# Patient Record
Sex: FEMALE | Race: WHITE | NOT HISPANIC OR LATINO | URBAN - METROPOLITAN AREA
[De-identification: names, ages, dates, MRNs, and addresses within clinical notes are randomized per-mention and may not be internally consistent; named-entity substitution may affect disease eponyms.]

---

## 2017-04-28 ENCOUNTER — ON CAMPUS - OUTPATIENT (AMBULATORY)
Dept: URBAN - METROPOLITAN AREA HOSPITAL 2 | Facility: HOSPITAL | Age: 37
End: 2017-04-28
Payer: COMMERCIAL

## 2017-04-28 ENCOUNTER — OFFICE (AMBULATORY)
Dept: URBAN - METROPOLITAN AREA CLINIC 64 | Facility: CLINIC | Age: 37
End: 2017-04-28
Payer: COMMERCIAL

## 2017-04-28 VITALS
DIASTOLIC BLOOD PRESSURE: 91 MMHG | SYSTOLIC BLOOD PRESSURE: 122 MMHG | WEIGHT: 190 LBS | HEART RATE: 79 BPM | SYSTOLIC BLOOD PRESSURE: 127 MMHG | OXYGEN SATURATION: 100 % | DIASTOLIC BLOOD PRESSURE: 87 MMHG | RESPIRATION RATE: 18 BRPM | DIASTOLIC BLOOD PRESSURE: 101 MMHG | SYSTOLIC BLOOD PRESSURE: 164 MMHG | OXYGEN SATURATION: 99 % | DIASTOLIC BLOOD PRESSURE: 104 MMHG | TEMPERATURE: 97.2 F | HEART RATE: 64 BPM | DIASTOLIC BLOOD PRESSURE: 76 MMHG | HEART RATE: 71 BPM | OXYGEN SATURATION: 98 % | HEART RATE: 66 BPM | RESPIRATION RATE: 16 BRPM | HEART RATE: 74 BPM | HEIGHT: 62 IN | SYSTOLIC BLOOD PRESSURE: 139 MMHG | SYSTOLIC BLOOD PRESSURE: 147 MMHG

## 2017-04-28 DIAGNOSIS — K22.2 ESOPHAGEAL OBSTRUCTION: ICD-10-CM

## 2017-04-28 DIAGNOSIS — R13.10 DYSPHAGIA, UNSPECIFIED: ICD-10-CM

## 2017-04-28 LAB
GI HISTOLOGY: A. UNSPECIFIED: (no result)
GI HISTOLOGY: PDF REPORT: (no result)

## 2017-04-28 PROCEDURE — 88305 TISSUE EXAM BY PATHOLOGIST: CPT | Performed by: INTERNAL MEDICINE

## 2017-04-28 PROCEDURE — 43450 DILATE ESOPHAGUS 1/MULT PASS: CPT | Performed by: INTERNAL MEDICINE

## 2017-04-28 PROCEDURE — 43235 EGD DIAGNOSTIC BRUSH WASH: CPT | Performed by: INTERNAL MEDICINE

## 2017-04-28 RX ADMIN — PROPOFOL: 10 INJECTION, EMULSION INTRAVENOUS at 11:18

## 2023-08-30 ENCOUNTER — OFFICE (AMBULATORY)
Dept: URBAN - METROPOLITAN AREA CLINIC 64 | Facility: CLINIC | Age: 43
End: 2023-08-30

## 2023-08-30 VITALS
HEIGHT: 62 IN | SYSTOLIC BLOOD PRESSURE: 106 MMHG | WEIGHT: 173 LBS | HEART RATE: 69 BPM | DIASTOLIC BLOOD PRESSURE: 71 MMHG

## 2023-08-30 DIAGNOSIS — R19.7 DIARRHEA, UNSPECIFIED: ICD-10-CM

## 2023-08-30 DIAGNOSIS — R10.30 LOWER ABDOMINAL PAIN, UNSPECIFIED: ICD-10-CM

## 2023-08-30 DIAGNOSIS — R63.4 ABNORMAL WEIGHT LOSS: ICD-10-CM

## 2023-08-30 DIAGNOSIS — K62.5 HEMORRHAGE OF ANUS AND RECTUM: ICD-10-CM

## 2023-08-30 PROCEDURE — 99204 OFFICE O/P NEW MOD 45 MIN: CPT

## 2023-09-06 ENCOUNTER — ON CAMPUS - OUTPATIENT (AMBULATORY)
Dept: URBAN - METROPOLITAN AREA HOSPITAL 2 | Facility: HOSPITAL | Age: 43
End: 2023-09-06

## 2023-09-06 ENCOUNTER — OFFICE (AMBULATORY)
Dept: URBAN - METROPOLITAN AREA PATHOLOGY 4 | Facility: PATHOLOGY | Age: 43
End: 2023-09-06

## 2023-09-06 VITALS
SYSTOLIC BLOOD PRESSURE: 121 MMHG | HEART RATE: 88 BPM | HEART RATE: 89 BPM | DIASTOLIC BLOOD PRESSURE: 46 MMHG | SYSTOLIC BLOOD PRESSURE: 114 MMHG | OXYGEN SATURATION: 100 % | HEART RATE: 73 BPM | RESPIRATION RATE: 17 BRPM | HEART RATE: 94 BPM | HEART RATE: 87 BPM | DIASTOLIC BLOOD PRESSURE: 62 MMHG | WEIGHT: 169 LBS | TEMPERATURE: 98 F | DIASTOLIC BLOOD PRESSURE: 78 MMHG | OXYGEN SATURATION: 99 % | DIASTOLIC BLOOD PRESSURE: 79 MMHG | SYSTOLIC BLOOD PRESSURE: 95 MMHG | SYSTOLIC BLOOD PRESSURE: 118 MMHG | DIASTOLIC BLOOD PRESSURE: 60 MMHG | HEIGHT: 62 IN | SYSTOLIC BLOOD PRESSURE: 116 MMHG | RESPIRATION RATE: 16 BRPM | DIASTOLIC BLOOD PRESSURE: 70 MMHG | SYSTOLIC BLOOD PRESSURE: 115 MMHG | SYSTOLIC BLOOD PRESSURE: 89 MMHG | HEART RATE: 71 BPM | SYSTOLIC BLOOD PRESSURE: 96 MMHG | HEART RATE: 74 BPM | DIASTOLIC BLOOD PRESSURE: 83 MMHG | RESPIRATION RATE: 14 BRPM | DIASTOLIC BLOOD PRESSURE: 71 MMHG

## 2023-09-06 DIAGNOSIS — K63.5 POLYP OF COLON: ICD-10-CM

## 2023-09-06 DIAGNOSIS — K62.5 HEMORRHAGE OF ANUS AND RECTUM: ICD-10-CM

## 2023-09-06 DIAGNOSIS — K62.1 RECTAL POLYP: ICD-10-CM

## 2023-09-06 DIAGNOSIS — K64.1 SECOND DEGREE HEMORRHOIDS: ICD-10-CM

## 2023-09-06 LAB
GI HISTOLOGY: A. UNSPECIFIED: (no result)
GI HISTOLOGY: PDF REPORT: (no result)

## 2023-09-06 PROCEDURE — 88305 TISSUE EXAM BY PATHOLOGIST: CPT | Performed by: INTERNAL MEDICINE

## 2023-09-06 PROCEDURE — 45385 COLONOSCOPY W/LESION REMOVAL: CPT | Performed by: INTERNAL MEDICINE

## 2025-06-05 ENCOUNTER — PATIENT ROUNDING (BHMG ONLY) (OUTPATIENT)
Dept: FAMILY MEDICINE CLINIC | Facility: CLINIC | Age: 45
End: 2025-06-05
Payer: COMMERCIAL

## 2025-06-05 ENCOUNTER — OFFICE VISIT (OUTPATIENT)
Dept: FAMILY MEDICINE CLINIC | Facility: CLINIC | Age: 45
End: 2025-06-05
Payer: COMMERCIAL

## 2025-06-05 VITALS
OXYGEN SATURATION: 98 % | TEMPERATURE: 98.6 F | RESPIRATION RATE: 18 BRPM | HEIGHT: 61 IN | BODY MASS INDEX: 33.76 KG/M2 | SYSTOLIC BLOOD PRESSURE: 150 MMHG | WEIGHT: 178.8 LBS | DIASTOLIC BLOOD PRESSURE: 98 MMHG

## 2025-06-05 DIAGNOSIS — Z13.0 SCREENING FOR ENDOCRINE, NUTRITIONAL, METABOLIC AND IMMUNITY DISORDER: ICD-10-CM

## 2025-06-05 DIAGNOSIS — Z11.59 NEED FOR HEPATITIS C SCREENING TEST: ICD-10-CM

## 2025-06-05 DIAGNOSIS — Z13.1 SCREENING FOR DIABETES MELLITUS (DM): ICD-10-CM

## 2025-06-05 DIAGNOSIS — E55.9 VITAMIN D DEFICIENCY: ICD-10-CM

## 2025-06-05 DIAGNOSIS — Z12.31 ENCOUNTER FOR SCREENING MAMMOGRAM FOR MALIGNANT NEOPLASM OF BREAST: ICD-10-CM

## 2025-06-05 DIAGNOSIS — M79.7 FIBROMYALGIA: ICD-10-CM

## 2025-06-05 DIAGNOSIS — I10 PRIMARY HYPERTENSION: Primary | ICD-10-CM

## 2025-06-05 DIAGNOSIS — Z13.21 SCREENING FOR ENDOCRINE, NUTRITIONAL, METABOLIC AND IMMUNITY DISORDER: ICD-10-CM

## 2025-06-05 DIAGNOSIS — G47.00 INSOMNIA, UNSPECIFIED TYPE: ICD-10-CM

## 2025-06-05 DIAGNOSIS — Z13.228 SCREENING FOR ENDOCRINE, NUTRITIONAL, METABOLIC AND IMMUNITY DISORDER: ICD-10-CM

## 2025-06-05 DIAGNOSIS — Z13.29 SCREENING FOR ENDOCRINE, NUTRITIONAL, METABOLIC AND IMMUNITY DISORDER: ICD-10-CM

## 2025-06-05 DIAGNOSIS — E03.9 HYPOTHYROIDISM, UNSPECIFIED TYPE: ICD-10-CM

## 2025-06-05 LAB
25(OH)D3 SERPL-MCNC: 33 NG/ML (ref 30–100)
ALBUMIN SERPL-MCNC: 4.4 G/DL (ref 3.5–5.2)
ALBUMIN/GLOB SERPL: 1.5 G/DL
ALP SERPL-CCNC: 121 U/L (ref 39–117)
ALT SERPL W P-5'-P-CCNC: 22 U/L (ref 1–33)
ANION GAP SERPL CALCULATED.3IONS-SCNC: 11.5 MMOL/L (ref 5–15)
AST SERPL-CCNC: 17 U/L (ref 1–32)
BASOPHILS # BLD AUTO: 0.12 10*3/MM3 (ref 0–0.2)
BASOPHILS NFR BLD AUTO: 1.1 % (ref 0–1.5)
BILIRUB SERPL-MCNC: <0.2 MG/DL (ref 0–1.2)
BUN SERPL-MCNC: 12 MG/DL (ref 6–20)
BUN/CREAT SERPL: 13.8 (ref 7–25)
CALCIUM SPEC-SCNC: 9.6 MG/DL (ref 8.6–10.5)
CHLORIDE SERPL-SCNC: 103 MMOL/L (ref 98–107)
CO2 SERPL-SCNC: 23.5 MMOL/L (ref 22–29)
CREAT SERPL-MCNC: 0.87 MG/DL (ref 0.57–1)
DEPRECATED RDW RBC AUTO: 39.9 FL (ref 37–54)
EGFRCR SERPLBLD CKD-EPI 2021: 84.4 ML/MIN/1.73
EOSINOPHIL # BLD AUTO: 0.26 10*3/MM3 (ref 0–0.4)
EOSINOPHIL NFR BLD AUTO: 2.3 % (ref 0.3–6.2)
ERYTHROCYTE [DISTWIDTH] IN BLOOD BY AUTOMATED COUNT: 11.8 % (ref 12.3–15.4)
GLOBULIN UR ELPH-MCNC: 3 GM/DL
GLUCOSE SERPL-MCNC: 93 MG/DL (ref 65–99)
HBA1C MFR BLD: 5.6 % (ref 4.8–5.6)
HCT VFR BLD AUTO: 42.9 % (ref 34–46.6)
HCV AB SER QL: NORMAL
HGB BLD-MCNC: 15.2 G/DL (ref 12–15.9)
IMM GRANULOCYTES # BLD AUTO: 0.03 10*3/MM3 (ref 0–0.05)
IMM GRANULOCYTES NFR BLD AUTO: 0.3 % (ref 0–0.5)
LYMPHOCYTES # BLD AUTO: 4.48 10*3/MM3 (ref 0.7–3.1)
LYMPHOCYTES NFR BLD AUTO: 40.1 % (ref 19.6–45.3)
MCH RBC QN AUTO: 32.8 PG (ref 26.6–33)
MCHC RBC AUTO-ENTMCNC: 35.4 G/DL (ref 31.5–35.7)
MCV RBC AUTO: 92.5 FL (ref 79–97)
MONOCYTES # BLD AUTO: 0.61 10*3/MM3 (ref 0.1–0.9)
MONOCYTES NFR BLD AUTO: 5.5 % (ref 5–12)
NEUTROPHILS NFR BLD AUTO: 5.68 10*3/MM3 (ref 1.7–7)
NEUTROPHILS NFR BLD AUTO: 50.7 % (ref 42.7–76)
NRBC BLD AUTO-RTO: 0 /100 WBC (ref 0–0.2)
PLATELET # BLD AUTO: 357 10*3/MM3 (ref 140–450)
PMV BLD AUTO: 9.9 FL (ref 6–12)
POTASSIUM SERPL-SCNC: 4 MMOL/L (ref 3.5–5.2)
PROT SERPL-MCNC: 7.4 G/DL (ref 6–8.5)
RBC # BLD AUTO: 4.64 10*6/MM3 (ref 3.77–5.28)
SODIUM SERPL-SCNC: 138 MMOL/L (ref 136–145)
T4 FREE SERPL-MCNC: 0.95 NG/DL (ref 0.92–1.68)
TSH SERPL DL<=0.05 MIU/L-ACNC: 7.5 UIU/ML (ref 0.27–4.2)
WBC NRBC COR # BLD AUTO: 11.18 10*3/MM3 (ref 3.4–10.8)

## 2025-06-05 PROCEDURE — 82306 VITAMIN D 25 HYDROXY: CPT

## 2025-06-05 PROCEDURE — 84439 ASSAY OF FREE THYROXINE: CPT

## 2025-06-05 PROCEDURE — 80050 GENERAL HEALTH PANEL: CPT

## 2025-06-05 PROCEDURE — 86803 HEPATITIS C AB TEST: CPT

## 2025-06-05 PROCEDURE — 83036 HEMOGLOBIN GLYCOSYLATED A1C: CPT

## 2025-06-05 RX ORDER — LOSARTAN POTASSIUM 50 MG/1
50 TABLET ORAL DAILY
Qty: 90 TABLET | Refills: 0 | Status: SHIPPED | OUTPATIENT
Start: 2025-06-05

## 2025-06-05 RX ORDER — METOPROLOL SUCCINATE 25 MG/1
TABLET, EXTENDED RELEASE ORAL
COMMUNITY
Start: 2018-11-12 | End: 2025-06-05 | Stop reason: SDUPTHER

## 2025-06-05 RX ORDER — ERGOCALCIFEROL 1.25 MG/1
50000 CAPSULE, LIQUID FILLED ORAL WEEKLY
Qty: 5 CAPSULE | Refills: 0 | Status: SHIPPED | OUTPATIENT
Start: 2025-06-05

## 2025-06-05 RX ORDER — METOPROLOL SUCCINATE 25 MG/1
25 TABLET, EXTENDED RELEASE ORAL DAILY
Qty: 90 TABLET | Refills: 0 | Status: SHIPPED | OUTPATIENT
Start: 2025-06-05

## 2025-06-05 RX ORDER — LEVOTHYROXINE SODIUM 112 UG/1
112 TABLET ORAL
Qty: 90 TABLET | Refills: 0 | Status: SHIPPED | OUTPATIENT
Start: 2025-06-05 | End: 2025-06-11 | Stop reason: SDUPTHER

## 2025-06-05 RX ORDER — HYDROCHLOROTHIAZIDE 25 MG/1
TABLET ORAL
COMMUNITY
End: 2025-06-05 | Stop reason: SDUPTHER

## 2025-06-05 RX ORDER — HYDROCHLOROTHIAZIDE 25 MG/1
25 TABLET ORAL DAILY
Qty: 90 TABLET | Refills: 0 | Status: SHIPPED | OUTPATIENT
Start: 2025-06-05

## 2025-06-05 RX ORDER — MELOXICAM 15 MG/1
15 TABLET ORAL DAILY
Qty: 90 TABLET | Refills: 0 | Status: SHIPPED | OUTPATIENT
Start: 2025-06-05

## 2025-06-05 RX ORDER — TRAZODONE HYDROCHLORIDE 50 MG/1
50 TABLET ORAL NIGHTLY
Qty: 90 TABLET | Refills: 0 | Status: SHIPPED | OUTPATIENT
Start: 2025-06-05

## 2025-06-05 RX ORDER — LEVOTHYROXINE SODIUM 112 UG/1
TABLET ORAL
COMMUNITY
End: 2025-06-05 | Stop reason: SDUPTHER

## 2025-06-05 RX ORDER — DULOXETIN HYDROCHLORIDE 60 MG/1
60 CAPSULE, DELAYED RELEASE ORAL DAILY
Qty: 90 CAPSULE | Refills: 0 | Status: SHIPPED | OUTPATIENT
Start: 2025-06-05

## 2025-06-05 RX ORDER — DULOXETIN HYDROCHLORIDE 60 MG/1
CAPSULE, DELAYED RELEASE ORAL
COMMUNITY
Start: 2010-05-31 | End: 2025-06-05 | Stop reason: SDUPTHER

## 2025-06-05 NOTE — PROGRESS NOTES
Chief Complaint  Establish Care (Pt has not had any HYN, fibromyalgia  and thyroid meds filled since 11/2024.)    Subjective    History of Present Illness {CC  Problem List  Visit  Diagnosis   Encounters  Notes  Medications  Labs  Result Review Imaging  Media :23}     Sandra Duque presents to [facility] for hospitals Care (Pt has not had any HYN, fibromyalgia  and thyroid meds filled since 11/2024.).      History of Present Illness     History of Present Illness  The patient presents for [facility] care and evaluation of hypothyroidism, fibromyalgia, and arthritis.    They have been off their levothyroxine for several months, resulting in persistent fatigue. They have not undergone any thyroid imaging in the past. They were diagnosed with hypothyroidism approximately 10 to 15 years ago.    They report experiencing pain in all their joints, a symptom consistent with their fibromyalgia diagnosis from several years ago. They have had approximately 5 to 10 severe flare-ups over the past 20 years. They are currently on Mobic 15 mg, which they tolerate well without any exacerbation of acid reflux. They were previously on Cymbalta until a few weeks ago, which they found beneficial for their condition.    They also report skin peeling around their hands and the recent development of a painful cyst-like lesion on their hand within the last 2 weeks. They express concern about potential arthritis in their hands.    They have been non-compliant with their medication regimen since 11/2024 due to personal circumstances, including their mother's cancer diagnosis and subsequent treatment. They have been off Cymbalta for a few weeks, which they find challenging as it significantly aids their daily functioning. They have been on this medication for an extended period.    They are currently taking a weekly vitamin D supplement. Several years ago, they required vitamin D injections.    They are  "experiencing cognitive difficulties, including memory issues and confusion. They are also feeling overwhelmed by their current life circumstances. They acknowledge the need for a mammogram, as they received a notification indicating that it is overdue.    FAMILY HISTORY  Their mother was diagnosed with cancer and underwent radiation therapy. Their mother was diagnosed with cancer again recently.        Objective     Vital Signs:   /98   Temp 98.6 °F (37 °C) (Temporal)   Resp 18   Ht 154.9 cm (61\")   Wt 81.1 kg (178 lb 12.8 oz)   SpO2 98%   BMI 33.78 kg/m²   Current Outpatient Medications on File Prior to Visit   Medication Sig Dispense Refill    [DISCONTINUED] DULoxetine (CYMBALTA) 60 MG capsule  (Patient not taking: Reported on 6/5/2025)      [DISCONTINUED] hydroCHLOROthiazide 25 MG tablet  (Patient not taking: Reported on 6/5/2025)      [DISCONTINUED] levothyroxine (SYNTHROID, LEVOTHROID) 112 MCG tablet  (Patient not taking: Reported on 6/5/2025)      [DISCONTINUED] losartan (COZAAR) 50 MG tablet Take 50 mg by mouth Daily. (Patient not taking: Reported on 6/5/2025)      [DISCONTINUED] Meloxicam (MOBIC PO) MOBIC TABS (Patient not taking: Reported on 6/5/2025)      [DISCONTINUED] metoprolol succinate XL (Toprol XL) 25 MG 24 hr tablet TOPROL XL TABLET EXTENDED RELEASE 24 HOUR (Patient not taking: Reported on 6/5/2025)      [DISCONTINUED] traZODone (DESYREL) 50 MG tablet Take 50 mg by mouth Every Night. (Patient not taking: Reported on 6/5/2025)      [DISCONTINUED] vitamin D (ERGOCALCIFEROL) 1.25 MG (99057 UT) capsule capsule Take 50,000 Units by mouth 1 (One) Time Per Week. (Patient not taking: Reported on 6/5/2025)       No current facility-administered medications on file prior to visit.        Past Medical History:   Diagnosis Date    Disease of thyroid gland     Fibromyalgia, primary 2010    Hypertension     Hypothyroidism       Past Surgical History:   Procedure Laterality Date    APPENDECTOMY      " HYSTERECTOMY        Family History   Problem Relation Age of Onset    Cancer Mother         Vulvar    Diabetes Mother     Hypertension Mother     Kidney disease Mother     Hypertension Father     Vision loss Father     Cancer Maternal Grandmother         Breast    Cancer Maternal Aunt         Colon      Social History     Socioeconomic History    Marital status:    Tobacco Use    Smoking status: Every Day     Current packs/day: 0.00     Average packs/day: 1 pack/day for 15.0 years (15.0 ttl pk-yrs)     Types: Cigarettes     Last attempt to quit: 2020     Years since quittin.9    Smokeless tobacco: Never   Vaping Use    Vaping status: Never Used   Substance and Sexual Activity    Alcohol use: Not Currently    Drug use: Never    Sexual activity: Yes     Partners: Male     Birth control/protection: None         No visits with results within 3 Month(s) from this visit.   Latest known visit with results is:   No results found for any previous visit.         Physical Exam  Constitutional:       Appearance: Normal appearance.   HENT:      Head: Normocephalic and atraumatic.   Eyes:      General: No scleral icterus.     Extraocular Movements: Extraocular movements intact.   Cardiovascular:      Rate and Rhythm: Normal rate and regular rhythm.      Pulses: Normal pulses.      Heart sounds: Normal heart sounds. No murmur heard.     No friction rub. No gallop.   Pulmonary:      Effort: Pulmonary effort is normal.      Breath sounds: Normal breath sounds. No wheezing, rhonchi or rales.   Abdominal:      General: Abdomen is flat. Bowel sounds are normal.      Palpations: Abdomen is soft.      Tenderness: There is no abdominal tenderness. There is no right CVA tenderness or left CVA tenderness.   Musculoskeletal:      Cervical back: Neck supple.   Skin:     General: Skin is warm and dry.   Neurological:      Mental Status: She is alert. Mental status is at baseline.      Coordination: Coordination normal.       Gait: Gait normal.   Psychiatric:         Mood and Affect: Mood normal.         Behavior: Behavior normal.         Thought Content: Thought content normal.         Judgment: Judgment normal.          Result Review  Data Reviewed:{ Labs  Result Review  Imaging  Med Tab  Media :23}   I have reviewed this patient's chart.  I have reviewed previous labs, previous imaging, previous medications, and previous encounters with notes that were available in this patient's chart.               Assessment and Plan {CC Problem List  Visit Diagnosis  ROS  Review (Popup)  [facility] Maintenance  Quality  BestPractice  Medications  SmartSets  SnapShot Encounters  Media :23}      Primary hypertension      Orders:    hydroCHLOROthiazide 25 MG tablet; Take 1 tablet by mouth Daily.    losartan (COZAAR) 50 MG tablet; Take 1 tablet by mouth Daily.    metoprolol succinate XL (Toprol XL) 25 MG 24 hr tablet; Take 1 tablet by mouth Daily.    Insomnia, unspecified type    Orders:    traZODone (DESYREL) 50 MG tablet; Take 1 tablet by mouth Every Night.    Vitamin D deficiency    Orders:    vitamin D (ERGOCALCIFEROL) 1.25 MG (52988 UT) capsule capsule; Take 1 capsule by mouth 1 (One) Time Per Week.    Vitamin D 25 hydroxy; Future    Fibromyalgia    Orders:    DULoxetine (CYMBALTA) 60 MG capsule; Take 1 capsule by mouth Daily.    meloxicam (Mobic) 15 MG tablet; Take 1 tablet by mouth Daily.    Hypothyroidism, unspecified type    Orders:    levothyroxine (SYNTHROID, LEVOTHROID) 112 MCG tablet; Take 1 tablet by mouth Every Morning.    TSH Rfx On Abnormal To Free T4; Future    Encounter for screening mammogram for malignant neoplasm of breast    Orders:    Mammo Screening Digital Tomosynthesis Bilateral With CAD; Future    Need for hepatitis C screening test    Orders:    Hepatitis C antibody; Future    Screening for endocrine, nutritional, metabolic and immunity disorder    Orders:    CBC & Differential    Comprehensive Metabolic  Panel    Screening for diabetes mellitus (DM)    Orders:    Hemoglobin A1c         Assessment & Plan  1. Hypothyroidism:  - Off levothyroxine for a couple of months, reports feeling tired all the time.  - Thyroid examination appears normal; no prior imaging of the thyroid.  - Advised to resume previous dose of levothyroxine and continue for at least 6 weeks before re-evaluation.  - Comprehensive set of laboratory tests to be conducted today.  - Lipid panel to be performed during follow-up visit.    2. Fibromyalgia:  - Reports chronic pain in every joint that moves, diagnosed with fibromyalgia for many years.  - Has not been taking Cymbalta for a couple of weeks, finds it hard to function without it.  - Will restart Cymbalta and Mobic 15 mg, previously effective in managing symptoms.  - Medications will be sent to pharmacy.    3. Arthritis:  - Reports changes in fingers and a new painful cyst-like lesion on hand that appeared within the last 2 weeks.  - Finger changes likely due to arthritis.  - Prefers to monitor condition for now instead of x-ray of wrist.  - Examination reveals a bony cyst-like lesion on hand.    4. Vitamin D deficiency:  - Vitamin D levels have been low in the past.  - Usually takes a once-weekly supplement.  - Will continue with weekly vitamin D supplementation.    5. Health maintenance:  - Needs a mammogram as it is past due.  - Mammogram will be scheduled.  - Follow-up in 3 months to review lab results and overall health status.      -  -ER red flags discussed with patient including risk versus benefit and education provided.  -Follow-up with me      Follow Up {Instructions Charge Capture  Follow-up Communications :23}     Patient was given instructions and counseling regarding her condition or for health maintenance advice. Please see specific information pulled into the AVS (placed there by myself) if appropriate.    Return in about 3 months (around 9/5/2025) for Follow up on chronic  conditions/ongoing concerns.      Ligia Boland PA-C      Patient or patient representative verbalized consent for the use of Ambient Listening during the visit with  Ligia Boland PA-C for chart documentation. 6/21/2025  20:12 EDT

## 2025-06-05 NOTE — PROGRESS NOTES
Venipuncture Blood Specimen Collection  Venipuncture performed in RT ARM by Sherry Perez with good hemostasis. Patient tolerated the procedure well without complications.   06/05/25   Sherry Perez

## 2025-06-05 NOTE — PROGRESS NOTES
June 5, 2025    Hello, may I speak with Sandra Duque?    My name is LITZY      I am  with [facility]  [address] [place] [address].    Before we get started may I verify your date of birth? 1980    I am calling to officially welcome you to our practice and ask about your recent visit. Is this a good time to talk? yes    Tell me about your visit with us. What things went well?  PATIENT STATES VISIT WAS FABULOUS        We're always looking for ways to make our patients' experiences even better. Do you have recommendations on ways we may improve?  no    Overall were you satisfied with your first visit to our practice? yes       I appreciate you taking the time to speak with me today. Is there anything else I can do for you? no      Thank you, and have a great day.

## 2025-07-08 DIAGNOSIS — E55.9 VITAMIN D DEFICIENCY: ICD-10-CM

## 2025-07-08 RX ORDER — ERGOCALCIFEROL 1.25 MG/1
50000 CAPSULE, LIQUID FILLED ORAL WEEKLY
Qty: 5 CAPSULE | Refills: 0 | Status: SHIPPED | OUTPATIENT
Start: 2025-07-08